# Patient Record
Sex: MALE | Race: WHITE | NOT HISPANIC OR LATINO
[De-identification: names, ages, dates, MRNs, and addresses within clinical notes are randomized per-mention and may not be internally consistent; named-entity substitution may affect disease eponyms.]

---

## 2017-04-14 ENCOUNTER — APPOINTMENT (OUTPATIENT)
Dept: DERMATOLOGY | Facility: CLINIC | Age: 82
End: 2017-04-14

## 2019-12-02 ENCOUNTER — APPOINTMENT (OUTPATIENT)
Dept: DERMATOLOGY | Facility: CLINIC | Age: 84
End: 2019-12-02
Payer: MEDICARE

## 2019-12-02 ENCOUNTER — RESULT REVIEW (OUTPATIENT)
Age: 84
End: 2019-12-02

## 2019-12-02 PROCEDURE — 11102 TANGNTL BX SKIN SINGLE LES: CPT

## 2019-12-02 PROCEDURE — 99202 OFFICE O/P NEW SF 15 MIN: CPT | Mod: 25

## 2019-12-19 ENCOUNTER — APPOINTMENT (OUTPATIENT)
Dept: DERMATOLOGY | Facility: CLINIC | Age: 84
End: 2019-12-19
Payer: MEDICARE

## 2019-12-19 DIAGNOSIS — C44.319 BASAL CELL CARCINOMA OF SKIN OF OTHER PARTS OF FACE: ICD-10-CM

## 2019-12-19 PROCEDURE — 17311 MOHS 1 STAGE H/N/HF/G: CPT

## 2019-12-19 PROCEDURE — 14041 TIS TRNFR F/C/C/M/N/A/G/H/F: CPT

## 2019-12-19 PROCEDURE — 17312 MOHS ADDL STAGE: CPT

## 2019-12-21 ENCOUNTER — EMERGENCY (EMERGENCY)
Facility: HOSPITAL | Age: 84
LOS: 1 days | Discharge: HOME | End: 2019-12-21
Attending: EMERGENCY MEDICINE
Payer: MEDICARE

## 2019-12-21 VITALS
RESPIRATION RATE: 18 BRPM | HEART RATE: 82 BPM | HEIGHT: 73 IN | WEIGHT: 145.06 LBS | TEMPERATURE: 98 F | OXYGEN SATURATION: 99 % | DIASTOLIC BLOOD PRESSURE: 81 MMHG | SYSTOLIC BLOOD PRESSURE: 124 MMHG

## 2019-12-21 PROCEDURE — 99282 EMERGENCY DEPT VISIT SF MDM: CPT

## 2019-12-21 NOTE — ED ADULT TRIAGE NOTE - CHIEF COMPLAINT QUOTE
has stitches forehead from a procedure yesterday at dermatologist  and now has swelling under left eye

## 2019-12-21 NOTE — ED PROVIDER NOTE - OBJECTIVE STATEMENT
83 y/o M s/p MOHS procedure on Thursday with "a dermatologist across the street"  He presents today with wife as there is swelling under left eye, the same side as the surgery.  He denies fever, chills, redness or discharge of site, no increased visual disturbances.

## 2019-12-21 NOTE — ED PROVIDER NOTE - PATIENT PORTAL LINK FT
You can access the FollowMyHealth Patient Portal offered by [facility] by registering at the following website: [email or web address]. By joining [facility]’s FollowMyHealth portal, you will also be able to view your health information using other applications (apps) compatible with our system.

## 2019-12-21 NOTE — ED PROVIDER NOTE - ATTENDING CONTRIBUTION TO CARE
The patient seen and examined    Post op change    I, Ulises Whipple, performed the initial face to face bedside interview with this patient regarding history of present illness, review of symptoms and relevant past medical, social and family history.  I completed an independent physical examination.  I was the initial provider who evaluated this patient. I have signed out the follow up of any pending tests (i.e. labs, radiological studies) to the ACP.  I have communicated the patient’s plan of care and disposition with the ACP.

## 2019-12-21 NOTE — ED PROVIDER NOTE - CLINICAL SUMMARY MEDICAL DECISION MAKING FREE TEXT BOX
As there is no evidence of infection and swelling is dependent, will d/c home.  I reassured patient and wife that swelling and discoloration under eye was normal after procedure.  f/u dermatologist.

## 2019-12-21 NOTE — ED PROVIDER NOTE - PHYSICAL EXAMINATION
SKIN:  left temple with 5cm crescent shaped incision, no redness, drainage or swelling  dependent swelling and bruising under left eye

## 2019-12-21 NOTE — ED ADULT NURSE NOTE - PSH
History of Appendectomy    History of Tonsillectomy  1960  Unilateral Inguinal Hernia repair- right  approx. 20 years ago

## 2020-01-01 ENCOUNTER — LABORATORY RESULT (OUTPATIENT)
Age: 85
End: 2020-01-01

## 2020-01-01 ENCOUNTER — NON-APPOINTMENT (OUTPATIENT)
Age: 85
End: 2020-01-01

## 2020-01-01 ENCOUNTER — APPOINTMENT (OUTPATIENT)
Dept: INTERNAL MEDICINE | Facility: CLINIC | Age: 85
End: 2020-01-01
Payer: MEDICARE

## 2020-01-01 VITALS
RESPIRATION RATE: 16 BRPM | BODY MASS INDEX: 19.77 KG/M2 | WEIGHT: 146 LBS | DIASTOLIC BLOOD PRESSURE: 84 MMHG | HEIGHT: 72 IN | SYSTOLIC BLOOD PRESSURE: 132 MMHG | OXYGEN SATURATION: 98 % | TEMPERATURE: 97.6 F | HEART RATE: 76 BPM

## 2020-01-01 DIAGNOSIS — Z81.8 FAMILY HISTORY OF OTHER MENTAL AND BEHAVIORAL DISORDERS: ICD-10-CM

## 2020-01-01 DIAGNOSIS — K59.09 OTHER CONSTIPATION: ICD-10-CM

## 2020-01-01 DIAGNOSIS — E83.52 HYPERCALCEMIA: ICD-10-CM

## 2020-01-01 DIAGNOSIS — I48.91 UNSPECIFIED ATRIAL FIBRILLATION: ICD-10-CM

## 2020-01-01 DIAGNOSIS — Z80.3 FAMILY HISTORY OF MALIGNANT NEOPLASM OF BREAST: ICD-10-CM

## 2020-01-01 DIAGNOSIS — R41.3 OTHER AMNESIA: ICD-10-CM

## 2020-01-01 DIAGNOSIS — R01.1 CARDIAC MURMUR, UNSPECIFIED: ICD-10-CM

## 2020-01-01 LAB
25(OH)D3 SERPL-MCNC: 52.9 NG/ML
ALBUMIN SERPL ELPH-MCNC: 4.5 G/DL
ALP BLD-CCNC: 62 U/L
ALT SERPL-CCNC: 29 U/L
ANION GAP SERPL CALC-SCNC: 9 MMOL/L
AST SERPL-CCNC: 27 U/L
BASOPHILS # BLD AUTO: 0.01 K/UL
BASOPHILS NFR BLD AUTO: 0.1 %
BILIRUB SERPL-MCNC: 0.4 MG/DL
BUN SERPL-MCNC: 20 MG/DL
CALCIUM SERPL-MCNC: 10.6 MG/DL
CALCIUM SERPL-MCNC: 11.1 MG/DL
CHLORIDE SERPL-SCNC: 102 MMOL/L
CO2 SERPL-SCNC: 29 MMOL/L
CREAT SERPL-MCNC: 1.21 MG/DL
EOSINOPHIL # BLD AUTO: 0.04 K/UL
EOSINOPHIL NFR BLD AUTO: 0.6 %
FOLATE SERPL-MCNC: >20 NG/ML
GLUCOSE SERPL-MCNC: 95 MG/DL
HCT VFR BLD CALC: 39.8 %
HGB BLD-MCNC: 12.9 G/DL
IMM GRANULOCYTES NFR BLD AUTO: 0.1 %
LYMPHOCYTES # BLD AUTO: 2.13 K/UL
LYMPHOCYTES NFR BLD AUTO: 29.6 %
MAN DIFF?: NORMAL
MCHC RBC-ENTMCNC: 31.8 PG
MCHC RBC-ENTMCNC: 32.4 GM/DL
MCV RBC AUTO: 98 FL
MONOCYTES # BLD AUTO: 1.45 K/UL
MONOCYTES NFR BLD AUTO: 20.1 %
NEUTROPHILS # BLD AUTO: 3.56 K/UL
NEUTROPHILS NFR BLD AUTO: 49.5 %
PARATHYROID HORMONE INTACT: 31 PG/ML
PLATELET # BLD AUTO: 237 K/UL
POTASSIUM SERPL-SCNC: 4.6 MMOL/L
PROT SERPL-MCNC: 7.4 G/DL
RBC # BLD: 4.06 M/UL
RBC # FLD: 13.4 %
SODIUM SERPL-SCNC: 140 MMOL/L
TSH SERPL-ACNC: 3.59 UIU/ML
VIT B12 SERPL-MCNC: 1346 PG/ML
WBC # FLD AUTO: 7.2 K/UL

## 2020-01-01 PROCEDURE — 36415 COLL VENOUS BLD VENIPUNCTURE: CPT

## 2020-01-01 PROCEDURE — 93000 ELECTROCARDIOGRAM COMPLETE: CPT

## 2020-01-01 PROCEDURE — 99203 OFFICE O/P NEW LOW 30 MIN: CPT | Mod: 25

## 2020-01-01 RX ORDER — OMEGA-3/DHA/EPA/FISH OIL 300-1000MG
1000 CAPSULE ORAL
Refills: 0 | Status: ACTIVE | COMMUNITY
Start: 2020-01-01

## 2020-01-01 RX ORDER — ELECTROLYTES/DEXTROSE
SOLUTION, ORAL ORAL
Refills: 0 | Status: ACTIVE | COMMUNITY
Start: 2020-01-01

## 2020-01-01 RX ORDER — ADHESIVE TAPE 3"X 2.3 YD
50 MCG TAPE, NON-MEDICATED TOPICAL
Refills: 0 | Status: ACTIVE | COMMUNITY
Start: 2020-01-01

## 2020-12-10 PROBLEM — R41.3 MEMORY DIFFICULTIES: Status: ACTIVE | Noted: 2020-01-01

## 2020-12-10 PROBLEM — K59.09 CONSTIPATION, CHRONIC: Status: ACTIVE | Noted: 2020-01-01

## 2020-12-10 PROBLEM — I48.91 ATRIAL FIBRILLATION, CONTROLLED: Status: ACTIVE | Noted: 2020-01-01

## 2020-12-10 PROBLEM — Z80.3 FAMILY HISTORY OF BREAST CANCER: Status: ACTIVE | Noted: 2020-01-01

## 2020-12-10 PROBLEM — Z81.8 FAMILY HISTORY OF DEMENTIA: Status: ACTIVE | Noted: 2020-01-01

## 2020-12-10 PROBLEM — R01.1 HEART MURMUR: Status: ACTIVE | Noted: 2020-01-01

## 2020-12-11 PROBLEM — E83.52 HYPERCALCEMIA: Status: ACTIVE | Noted: 2020-01-01

## 2020-12-11 NOTE — ASSESSMENT
[FreeTextEntry1] : -PMH: Mild Dementia, H/o Left Renal Mass (s/p resection; 2011), H/o GIB 2/2 NSAID & Aspirin use\par -SH: . 1 child. Non-smoker. Occasional EtOH use. \par \par PALLAVI is a 85 year M whom is here today w/ c/o memory difficulties and to establish care w/ a new PMD\par \par Specialists Involved:\par -Prior PMD: Dr. Luz ([phone])\par -Cardio: Dr. Servin ([phone])\par \par \par This is an 85-year-old male with a history of atrial fibrillation in which he refuses cardiology followup and refuses to take any blood thinners along with a strong family history of Alzheimer-type dementia.\par He is here today for evaluation regarding his memory impairment\par Physical exam WNL except for atrial fibrillation which is rate controlled\par MMSE 25/30 with normal clock draw\par \par Concerns for both Alzheimer and vascular type dementia\par Therefore, recommend cardiology consultation\par Recommends MR head with and without IV contras to evaluate for vascular insults\par Followup TSH, B12 and folic acids rule out other etiologies of memory impairment\par Start donepezil 5 mg p.o. q.h.s. Followup in one month to evaluate response to treatment and if tolerating well we'll increase to 10 mg daily. Discussed potential side effects of medication which included GI upsets, urinary retention.\par \par \par -F/u labs drawn in office today\par -Further recs pending lab results\par -F/u MRI Brain to evaluate for Vascular Dementia\par -F/u 1mo after starting Donepezil 5mg PO QHS

## 2020-12-11 NOTE — ADDENDUM
[FreeTextEntry1] : B12 & Folate WNL\par TSH WNL\par Ca 10.6 therefore check PTH & Vit-D\par \par Repeat calcium 11.1\par Vitamin D and parathyroid hormone WNL\par Unclear etiology hypercalcemia. I do recommend increased p.o. hydration. Recommend repeat CMP and vitamin D level and parathyroid hormone related peptide in 2 weels. Pending those results may need endocrinology consultation for further evaluation. Patient can either come to office or go to [facility] lab.

## 2020-12-11 NOTE — HISTORY OF PRESENT ILLNESS
[FreeTextEntry1] : memory loss [de-identified] : -PMH: H/o Left Renal Mass (s/p resection; 2011), H/o GIB 2/2 NSAID & Aspirin use\par -SH: . 1 child. Non-smoker. Occasional EtOH use. \par \par PALLAVI is a 85 year M whom is here today w/ c/o memory difficulties and to establish care w/ a new PMD\par Today, pt reports feeling well and is w/o complaints. \par He is accompanied by his wife today whom help to provide much of the history as pt is a poor historian. \par They live in an apartment building\par His wife mentions that memory difficulties were first noted about 1 yr ago when patient got lost while driving. \par Patient denies any known problems with memory difficulties\par Symptoms have worsened over the last eyar. He has 3 sibling with known Dx of Dementia. \par Remains physically active. Their is reported loss of appetite. Denies HA, changes in vision or hearing, Hallucinations. \par \par Specialists Involved:\par -Prior PMD: Dr. Luz ([phone])\par -Cardio: Dr. Servin ([phone])

## 2020-12-11 NOTE — PHYSICAL EXAM
[No Acute Distress] : no acute distress [Well Nourished] : well nourished [Well Developed] : well developed [Well-Appearing] : well-appearing [Normal Sclera/Conjunctiva] : normal sclera/conjunctiva [PERRL] : pupils equal round and reactive to light [EOMI] : extraocular movements intact [Normal Outer Ear/Nose] : the outer ears and nose were normal in appearance [Normal Oropharynx] : the oropharynx was normal [No JVD] : no jugular venous distention [No Lymphadenopathy] : no lymphadenopathy [Supple] : supple [Thyroid Normal, No Nodules] : the thyroid was normal and there were no nodules present [No Respiratory Distress] : no respiratory distress  [No Accessory Muscle Use] : no accessory muscle use [Clear to Auscultation] : lungs were clear to auscultation bilaterally [Normal S1, S2] : normal S1 and S2 [No Carotid Bruits] : no carotid bruits [No Abdominal Bruit] : a ~M bruit was not heard ~T in the abdomen [No Varicosities] : no varicosities [Pedal Pulses Present] : the pedal pulses are present [No Edema] : there was no peripheral edema [No Palpable Aorta] : no palpable aorta [No Extremity Clubbing/Cyanosis] : no extremity clubbing/cyanosis [Soft] : abdomen soft [Non Tender] : non-tender [Non-distended] : non-distended [No Masses] : no abdominal mass palpated [No HSM] : no HSM [Normal Bowel Sounds] : normal bowel sounds [Normal Posterior Cervical Nodes] : no posterior cervical lymphadenopathy [Normal Anterior Cervical Nodes] : no anterior cervical lymphadenopathy [No CVA Tenderness] : no CVA  tenderness [No Spinal Tenderness] : no spinal tenderness [No Joint Swelling] : no joint swelling [Grossly Normal Strength/Tone] : grossly normal strength/tone [No Rash] : no rash [Coordination Grossly Intact] : coordination grossly intact [No Focal Deficits] : no focal deficits [Normal Gait] : normal gait [Normal Affect] : the affect was normal [Normal Insight/Judgement] : insight and judgment were intact [de-identified] : irregular HR. 3/6 systolic murmur [de-identified] : MMSE 25/30. Normal Clock Draw

## 2021-01-01 ENCOUNTER — NON-APPOINTMENT (OUTPATIENT)
Age: 86
End: 2021-01-01

## 2021-01-01 ENCOUNTER — APPOINTMENT (OUTPATIENT)
Dept: INTERNAL MEDICINE | Facility: CLINIC | Age: 86
End: 2021-01-01
